# Patient Record
Sex: FEMALE | Race: OTHER | NOT HISPANIC OR LATINO | URBAN - METROPOLITAN AREA
[De-identification: names, ages, dates, MRNs, and addresses within clinical notes are randomized per-mention and may not be internally consistent; named-entity substitution may affect disease eponyms.]

---

## 2024-11-01 ENCOUNTER — OFFICE (OUTPATIENT)
Dept: URBAN - METROPOLITAN AREA CLINIC 19 | Facility: CLINIC | Age: 29
End: 2024-11-01
Payer: COMMERCIAL

## 2024-11-01 VITALS
WEIGHT: 156 LBS | HEIGHT: 63 IN | SYSTOLIC BLOOD PRESSURE: 116 MMHG | OXYGEN SATURATION: 99 % | DIASTOLIC BLOOD PRESSURE: 77 MMHG | HEART RATE: 70 BPM

## 2024-11-01 DIAGNOSIS — K21.9 GASTRO-ESOPHAGEAL REFLUX DISEASE WITHOUT ESOPHAGITIS: ICD-10-CM

## 2024-11-01 DIAGNOSIS — R10.11 RIGHT UPPER QUADRANT PAIN: ICD-10-CM

## 2024-11-01 DIAGNOSIS — Z90.49 ACQUIRED ABSENCE OF OTHER SPECIFIED PARTS OF DIGESTIVE TRACT: ICD-10-CM

## 2024-11-01 PROCEDURE — 99214 OFFICE O/P EST MOD 30 MIN: CPT | Performed by: INTERNAL MEDICINE

## 2025-03-06 ENCOUNTER — OFFICE (OUTPATIENT)
Dept: URBAN - METROPOLITAN AREA CLINIC 11 | Facility: CLINIC | Age: 30
End: 2025-03-06
Payer: COMMERCIAL

## 2025-03-06 VITALS
DIASTOLIC BLOOD PRESSURE: 73 MMHG | HEIGHT: 63 IN | HEART RATE: 66 BPM | WEIGHT: 148 LBS | OXYGEN SATURATION: 98 % | SYSTOLIC BLOOD PRESSURE: 113 MMHG

## 2025-03-06 DIAGNOSIS — R10.11 RIGHT UPPER QUADRANT PAIN: ICD-10-CM

## 2025-03-06 DIAGNOSIS — K21.9 GASTRO-ESOPHAGEAL REFLUX DISEASE WITHOUT ESOPHAGITIS: ICD-10-CM

## 2025-03-06 PROCEDURE — 99214 OFFICE O/P EST MOD 30 MIN: CPT | Performed by: STUDENT IN AN ORGANIZED HEALTH CARE EDUCATION/TRAINING PROGRAM

## 2025-03-06 RX ORDER — FAMOTIDINE 20 MG/1
TABLET, FILM COATED ORAL
Qty: 30 | Refills: 11 | Status: ACTIVE
Start: 2025-03-06

## 2025-03-06 NOTE — SERVICEHPINOTES
Erika  Friend   is a   29   year old  female   known to Dr. John   with history of multiple medical problems here today for follow up for chronic RUQ abdominal pain s/p cholecystectomy.
moises de leon   She continues to complain of what she describes as "phantom pain" after elective cholecystectomy 12/14/2021 for "biliary dyskinesia".  Her preoperative evaluation was unremarkable including CT abd/pelvis 9/2/21 and MRI found only a liver hemangioma. HIDA scan 10/13/21 found a normal GBEF=81%. EGD 10/6/21 found mild gastritis. She has also had an H Pylori test with her PCP that was negative.moises de leonMore recent evaluation includes lab 9/30/24 notable for WBC of 15, and a CT abd/pelvis 9/30/24 that showed no acute GI abnormality. She proceeded with MRCP/MRI of her abdomen that was unremarkable. 
moises de leon She states the pain occurs every 6 months or so, and may last for a week or 2 at a time. She has not been able to identify any exacerbating or relieving foods or factors. She does note that since having her gallbladder removed if she eats fatty food she will develop diarrhea. She states if she avoids fatty foods she does not have any issues with this. She denies any other abdominal pain, fever, nausea, vomiting, bleeding, or weight loss.
moises de leon She does have history of reflux. She reports taking Prilosec 40 mg daily, but lately has noticed occasional breakthrough reflux if she eats a triggering food such as something spicy. She denies any dysphagia, epigastric pain, nausea, or vomiting. She denies any frequent NSAID use.  span style="background-color: rgb(194, 194, 194)" visited="true"She denies any known family history of GI malignancy. /span

## 2025-03-06 NOTE — SERVICENOTES
Thorough evaluation including labs, CT, and MRI have been unremarkable.  She has also had an EGD in 2021 that was unremarkable.   I did discuss with her at length.  We will add Pepcid in the evenings and advised to be mindful of her diet to see if this helps.  If no improvement may consider further evaluation, possibly with repeat endoscopic evaluation.  She verbalized understanding.

## 2025-06-16 ENCOUNTER — OFFICE (OUTPATIENT)
Dept: URBAN - METROPOLITAN AREA CLINIC 11 | Facility: CLINIC | Age: 30
End: 2025-06-16
Payer: COMMERCIAL

## 2025-06-16 VITALS
HEIGHT: 63 IN | OXYGEN SATURATION: 97 % | WEIGHT: 149 LBS | DIASTOLIC BLOOD PRESSURE: 72 MMHG | SYSTOLIC BLOOD PRESSURE: 119 MMHG | HEART RATE: 71 BPM

## 2025-06-16 DIAGNOSIS — R10.11 RIGHT UPPER QUADRANT PAIN: ICD-10-CM

## 2025-06-16 DIAGNOSIS — K21.9 GASTRO-ESOPHAGEAL REFLUX DISEASE WITHOUT ESOPHAGITIS: ICD-10-CM

## 2025-06-16 PROCEDURE — 99203 OFFICE O/P NEW LOW 30 MIN: CPT | Performed by: INTERNAL MEDICINE

## 2025-06-16 RX ORDER — HYOSCYAMINE SULFATE 0.12 MG/1
0.38 TABLET SUBLINGUAL
Qty: 30 | Refills: 5 | Status: ACTIVE
Start: 2025-06-16